# Patient Record
Sex: MALE | Employment: FULL TIME | URBAN - METROPOLITAN AREA
[De-identification: names, ages, dates, MRNs, and addresses within clinical notes are randomized per-mention and may not be internally consistent; named-entity substitution may affect disease eponyms.]

---

## 2021-01-13 ENCOUNTER — HOSPITAL ENCOUNTER (OUTPATIENT)
Dept: ULTRASOUND IMAGING | Facility: HOSPITAL | Age: 66
Discharge: HOME | End: 2021-01-13
Attending: FAMILY MEDICINE
Payer: COMMERCIAL

## 2021-01-13 DIAGNOSIS — E03.9 ACQUIRED HYPOTHYROIDISM: ICD-10-CM

## 2021-01-13 PROCEDURE — 76536 US EXAM OF HEAD AND NECK: CPT | Performed by: FAMILY MEDICINE

## 2024-01-08 ENCOUNTER — OFFICE VISIT (OUTPATIENT)
Dept: DERMATOLOGY CLINIC | Facility: CLINIC | Age: 69
End: 2024-01-08
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
## 2024-01-08 DIAGNOSIS — Z12.83 SKIN CANCER SCREENING: ICD-10-CM

## 2024-01-08 DIAGNOSIS — D22.9 MULTIPLE NEVI: ICD-10-CM

## 2024-01-08 DIAGNOSIS — L81.4 LENTIGO: ICD-10-CM

## 2024-01-08 DIAGNOSIS — D23.9 BENIGN NEOPLASM OF SKIN, UNSPECIFIED LOCATION: ICD-10-CM

## 2024-01-08 DIAGNOSIS — L81.2 EPHELIDES: ICD-10-CM

## 2024-01-08 DIAGNOSIS — L82.1 SK (SEBORRHEIC KERATOSIS): Primary | ICD-10-CM

## 2024-01-08 PROCEDURE — 1160F RVW MEDS BY RX/DR IN RCRD: CPT | Performed by: DERMATOLOGY

## 2024-01-08 PROCEDURE — 1159F MED LIST DOCD IN RCRD: CPT | Performed by: DERMATOLOGY

## 2024-01-08 PROCEDURE — 99203 OFFICE O/P NEW LOW 30 MIN: CPT | Performed by: DERMATOLOGY

## 2024-01-08 PROCEDURE — 1126F AMNT PAIN NOTED NONE PRSNT: CPT | Performed by: DERMATOLOGY

## 2024-01-08 RX ORDER — ALBUTEROL SULFATE 90 UG/1
1 AEROSOL, METERED RESPIRATORY (INHALATION)
COMMUNITY
Start: 2023-11-09 | End: 2024-11-08

## 2024-01-08 RX ORDER — FLUTICASONE PROPIONATE 50 MCG
SPRAY, SUSPENSION (ML) NASAL
COMMUNITY
Start: 2023-09-07

## 2024-01-14 NOTE — PATIENT INSTRUCTIONS
Sun Protection  Wear protective clothing. This includes wearing a broad rimmed hat, long sleeves, high collars, and tightly woven clothes. Specific clothing lines are available for people who need more complete sun avoidance. Use a broad spectrum sunblock (SPF 30 or higher when above options are not possible, or for areas not covered by clothing.    -Avoid being outside when the ultraviolet B (burning) wavelengths from the sun are most intense (10 AM to 3 PM) during non-winter seasons. [place] is year round.    -Ultraviolet A wavelengths from the sun are present year round from jhon to dusk. They pass through window glass. This type of ultraviolet is the form seen in tanning beds. It will accentuate sunburns and will produce most of the sunlight -induced aging changes in our skin because it penetrates much deeper into the skin than do the ultraviolet B wavelengths. It is also associated with an increased chance of developing melanoma, potentially the most serious type of skin cancer.    -Broad spectrum sunblocks:  Mexoryl is a sunblock ingredient that is superior to other UVA protection agents. It is found in Invoice[employer] (available at [employer] or [employer]). Sunblocks containing microfine zinc oxide (Z-sherman) or avobenzone (Parsol 1789) provide good UVA protection too.  Any sunscreen with the American Academy [employer] D[employer]at[employer] Seal is a recommended sunscreen.     -Use SPF 30 or greater an apply liberally. It takes 2 tablespoons or one shot glass to cover the average man in a swim suit. The SPF number provides information about the relative amount of protection provided by a product. Because most people use sunblock sparingly, they may get only 1/2 to 1/3 the SPF coverage indicated on the label. SPF may be sufficient in theory, but in real life it is best to use a higher sunblock, hats, SPF clothing and swimwear, and shade.    -Application technique: Apply sunscreens and sunblocks 20-30 minutes before start off sun  exposure and reapply 20 minutes after exposure begins. Studies suggest this provides improved protection. Reapply sunscreens and sunblocks after 2 hours of use or after 80 minutes if swimming or sweating. Sunscreen effectiveness diminishes after 2 hours unless properly reapplied.     Checking for Skin Cancer  You can find cancer early by checking your skin each month. There are 3 common kinds of skin cancer. They are melanoma, basal cell carcinoma, and squamous cell carcinoma. Doing monthly skin checks is the best way to find new marks or skin changes. Follow the instructions below for checking your skin.  The ABCDEs of checking moles for melanoma  Check your moles or growths for signs of melanoma using ABCDE:  Asymmetry: the sides of the mole or growth don’t match  Border: the edges are ragged, notched, or blurred  Color: the color within the mole or growth varies  Diameter: the mole or growth is larger than 6 mm (size of a pencil eraser)  Evolving: the size, shape, or color of the mole or growth is changing    Checking for other types of skin cancer  Basal cell carcinoma or squamous cell carcinoma have symptoms such as:  A spot or mole that looks different from all other marks on your skin  Changes in how an area feels, such as itching, tenderness, or pain  Changes in the skin's surface, such as oozing, bleeding, or scaliness  A sore that does not heal  New swelling or redness beyond the border of a mole  Who’s at risk?  Anyone can get skin cancer. But you are at greater risk if you have:  Fair skin, light-colored hair, or light-colored eyes  Many moles or abnormal moles on your skin  A history of sunburns from sunlight or tanning beds  A family history of skin cancer  A history of exposure to radiation or chemicals  A weakened immune system  If you have had skin cancer in the past, you are at risk for recurring skin cancer.  How to check your skin  Do your monthly skin checkups in front of a full-length mirror.  Check all parts of your body, including your:  Head (ears, face, neck, and scalp)  Torso (front, back, and sides)  Arms (tops, undersides, upper, and lower armpits)  Hands (palms, backs, and fingers, including under the nails)  Buttocks and genitals  Legs (front, back, and sides)  Feet (tops, soles, toes, including under the nails, and between toes)  If you have a lot of moles, take digital photos of them each month. Make sure to take photos both up close and from a distance. These can help you see if any moles change over time.  Most skin changes are not cancer. But if you see any changes in your skin, call your doctor right away. Only he or she can diagnose a problem. If you have skin cancer, seeing your doctor can be the first step toward getting the treatment that could save your life.  [employer] last reviewed this educational content on 4/1/2019 © 2000-2021 [employer]. All rights reserved. This information is not intended as a substitute for professional medical care. Always follow your healthcare professional's instructions.    Preventing Skin Cancer     Use sunscreen of SPF 30 or greater. Apply liberally.   Relaxing in the sun may feel good. But it isn’t good for your skin. In fact, the sun’s harmful rays are the major cause of skin cancer. This is a serious disease that can be life-threatening. People of all ages, races, and backgrounds are at risk.  Skin cancer is the most common cancer in the [place] But in most cases, it can be prevented.  Your role in prevention  You can act today to help prevent skin cancer. Start by avoiding the sun’s UV (ultraviolet) rays. And don’t use tanning beds or lamps. They are no safer than the sun. Taking these steps can help keep you from getting skin cancer. It can also help prevent wrinkles and other aging effects caused by the sun. Make sure your children also follow these safeguards. Now is the time to start taking steps to prevent skin cancer.  When you are  outdoors  Protect your skin when you go out during the day. Take safety steps whenever you go out to eat, run errands by car or on foot, or do any outdoor activity. There isn’t just one easy way to protect your skin. It’s best to follow all of these steps:  Wear tightly woven clothing that covers your skin. Put on a wide-brimmed hat to protect your face, ears, and scalp.  Watch the clock. Try to stay out of the sun between 10 a.m. and 4 p.m. That's when the sun's rays are strongest.  Head for the shade or create your own. Use an umbrella when sitting or strolling.  Know that the sun’s rays can reflect off sand, water, and snow. This can harm your skin. Take extra care when you are near reflective surfaces.  Keep in mind that even when the weather is hazy or cloudy, your skin can be exposed to strong UV rays.  Shield your skin with sunscreen. Also use sunscreen on your children’s skin. Keep babies younger than 6 months old out of the sun.  Tips for using sunscreen  To help prevent skin cancer, choose the right sunscreen and use it correctly. Try these tips:  Choose a sunscreen that has an SPF (sun protection factor) of at least 30. Also choose a sunscreen labeled \"broad spectrum.” This will protect you from both UVA and UVB (ultraviolet A and B) rays.  If one brand irritates your skin, try another, such as one without fragrance.  Use a water-resistant sunscreen if you swim or sweat.  Use at least 1 ounce of sunscreen to cover exposed areas. This is enough to fill a shot glass. You might need to adjust the amount depending on your body size.  Put the sunscreen on dry skin about 15 minutes before going outdoors. This gives it time to soak in.  Reapply sunscreen every 2 hours. If you’re active, do this more often.  Cover any sun-exposed skin, from your face to your feet. Don’t forget your scalp, ears, and lips.  Know that while sunscreen helps protect you, it isn’t enough. Sunscreens extend the length of time you can be  outdoors before your skin starts to get red. But they don't give you total protection. Using sunscreen doesn't mean you can stay out in the sun for an unlimited time. Your skin cells are still being damaged. You should also wear protective clothing. And try to stay out of the sun as much as you can, especially from 10 a.m. to 4 p.m.  [employer] last reviewed this educational content on 7/1/2019 © 2000-2021 [employer]. All rights reserved. This information is not intended as a substitute for professional medical care. Always follow your healthcare professional's instructions.    Common Types of Skin Cancer  Skin cancer is a serious disease that can affect anyone at any age. It's the most common kind of cancer in the [place] If found early, when it's small and hasn't spread, skin cancer can often be treated with success. But in some cases, it's life-threatening.  Talk with your healthcare provider about what you can do to help prevent skin cancer. Ask about regular skin exams as part of your routine physicals. You can also ask about doing monthly self-exams of your skin. If you see any changes in your skin, call your healthcare provider right away.   Understanding the skin  The skin is made up of 3 layers: epidermis, dermis, and hypodermis or fat layer. The epidermis is the thin outer layer of the skin. It consists of 3 types of cells: squamous cells, basal cells, and melanocytes. The squamous cells are flat cells in the outermost layer and are continuously shed. The basal cells are round cells found just beneath the squamous cells at the base of the epidermis. The melanocytes are found in every layer of the epidermis and make melanin. This give the skin its color. The dermis layer is the middle layer of skin and consists of blood and lymph vessels, hair follicles, oil and sweat glands, nerves and collagen. This layer give skin flexibility and strength. The fat layer is the deepest layer consisting of fat  and collagen. It helps provide the body's heat and protects the body from injury.  Skin cancer is a type of cancer that grows in the epidermal layer of the skin. It can form in the basal cells, squamous cells, or melanocytes. Skin cancer can be grouped into 2 types: non-melanoma and melanoma. The 2 most common types of non-melanoma skin cancer are basal cell carcinoma and squamous cell carcinoma.  Basal cell cancer  Basal cell cancer is the most common skin cancer. It starts in basal cells in the deepest part of the epidermis. It's usually found on the face, ears, neck, trunk, or arms, but it can start anywhere. Varying in color, these lesions may be waxy, pearly, scaly, or scar-like. Tiny blood vessels can sometimes be seen through the lesion’s surface. These lesions can bleed easily and might not heal well. Nearly all basal cell cancers can be treated and cured.  Squamous cell cancer  Squamous cell cancer is another type of skin cancer. It starts in flat cells called squamous cells in the upper part of the epidermis. Lesions often form on the face, ears, neck, hands, or arms. They can start in scars and sores that don't heal. The lesions are firm, red bumps or flat, scaly, crusty growths. Squamous cell carcinoma is more likely to grow and spread to other parts of the body than basal cell carcinoma, although this is still uncommon. Most squamous cell carcinoma is found early enough to be treated and cured.  Melanoma  Melanoma is a less common, but much more dangerous kind of skin cancer. It starts in skin cells called melanocytes. It's more likely to grow and spread than basal or squamous cell cancers. It's often not easy to tell where a melanoma lesion’s borders are. It's often brown or black, but it may be mixed in color. The shape and size of melanoma lesions tend to differ from one side to the other. Melanoma can start anywhere, like the genitals, mouth, palms of hands, bottoms of feet, and under the  nails.  There are other types of skin cancer, too. These include Merkel cell cancer, Kaposi sarcoma, and skin (cutaneous) lymphomas, but these cancers are quite rare.  Actinic keratosis  Actinic keratosis is not skin cancer. It's a common, pre-cancer skin change that can turn into a squamous cell skin cancer over time if left untreated. Actinic keratosis lesions tend to appear on sun-exposed parts of the body. They can be pink, reddish-brown, or skin-colored. These lesions are most often small, raised, scaly, and rough, like sandpaper. In some cases, actinic keratosis lesions hurt. Most people with them have more than one lesion. Getting early treatment for actinic keratoses almost always cures the lesions.  [employer] last reviewed this educational content on 6/1/2019  © 9957-7900 [employer]. All rights reserved. This information is not intended as a substitute for professional medical care. Always follow your healthcare professional's instructions.

## 2024-01-14 NOTE — PROGRESS NOTES
Jose Eduardo Rivera is a 68 year old male.  HPI:     CC:    Chief Complaint   Patient presents with    Lesion     NO hx of skin ca.  NEW pt presenting for lesion of concern to the chest for about 6 months.  Denies bleeding or pain.          Allergies:  Bee venom    HISTORY:    History reviewed. No pertinent past medical history.   History reviewed. No pertinent surgical history.   History reviewed. No pertinent family history.   Social History     Socioeconomic History    Marital status:    Tobacco Use    Smoking status: Never    Smokeless tobacco: Never   Vaping Use    Vaping Use: Never used   Substance and Sexual Activity    Alcohol use: Yes     Alcohol/week: 1.0 - 2.0 standard drink of alcohol     Types: 1 - 2 Glasses of wine per week    Drug use: Never   Other Topics Concern    Grew up on a farm No    History of tanning Yes    Outdoor occupation No    Reaction to local anesthetic No    Pt has a pacemaker No    Pt has a defibrillator No        Current Outpatient Medications   Medication Sig Dispense Refill    atorvastatin 40 MG Oral Tab Take 1 tablet (40 mg total) by mouth daily. 30 tablet 0    albuterol 108 (90 Base) MCG/ACT Inhalation Aero Soln Inhale 1 puff into the lungs. (Patient not taking: Reported on 1/8/2024)      fluticasone propionate 50 MCG/ACT Nasal Suspension SHAKE LIQUID AND USE 1 SPRAY IN EACH NOSTRIL IN THE MORNING AND IN THE EVENING (Patient not taking: Reported on 1/8/2024)       Allergies:   Allergies   Allergen Reactions    Bee Venom RASH       History reviewed. No pertinent past medical history.  History reviewed. No pertinent surgical history.  Social History     Socioeconomic History    Marital status:      Spouse name: Not on file    Number of children: Not on file    Years of education: Not on file    Highest education level: Not on file   Occupational History    Not on file   Tobacco Use    Smoking status: Never    Smokeless tobacco: Never   Vaping Use    Vaping Use: Never used    Substance and Sexual Activity    Alcohol use: Yes     Alcohol/week: 1.0 - 2.0 standard drink of alcohol     Types: 1 - 2 Glasses of wine per week    Drug use: Never    Sexual activity: Not on file   Other Topics Concern    Grew up on a farm No    History of tanning Yes    Outdoor occupation No    Reaction to local anesthetic No    Pt has a pacemaker No    Pt has a defibrillator No   Social History Narrative    Not on file     Social Determinants of Health     Financial Resource Strain: Not on file   Food Insecurity: Not on file   Transportation Needs: Not on file   Physical Activity: Not on file   Stress: Not on file   Social Connections: Not on file   Housing Stability: Not on file     History reviewed. No pertinent family history.    There were no vitals filed for this visit.    HPI:  Chief Complaint   Patient presents with    Lesion     NO hx of skin ca.  NEW pt presenting for lesion of concern to the chest for about 6 months.  Denies bleeding or pain.          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  Has noted varicose veins does wear compression    Patient presents with concerns above.    Patient has been in their usual state of health.     Past notes/ records and appropriate/relevant lab results including pathology and past body maps reviewed. Including outside notes/ PCP notes as appropriate. Updated and new information noted in current visit.     ROS:  Denies other relevant systemic complaints.      History, medications, allergies reviewed as noted.       Physical Examination:     Well-developed well-nourished patient alert oriented in no acute distress.  Exam performed, including scalp, head, neck, face,nails, hair, external eyes, including conjunctival mucosa, eyelids, lips external ears , arms, digits,palms.     Multiple light to medium  brown, well marginated, uniformly pigmented, macules and papules 6 mm and less scattered on exam. pigmented lesions examined with dermoscopy benign-appearing patterns.     Waxy tannish keratotic papules scattered, cherry-red vascular papules scattered.    See map today's date for lesions noted .  See assessment and plan below for specific lesions.    Otherwise remarkable for lesions as noted on map.    See A/P  below for additional information:    Assessment / plan:    No orders of the defined types were placed in this encounter.      Meds & Refills for this Visit:  Requested Prescriptions      No prescriptions requested or ordered in this encounter         Encounter Diagnoses   Name Primary?    SK (seborrheic keratosis) Yes    Ephelides     Lentigo     Multiple nevi     Benign neoplasm of skin, unspecified location     Skin cancer screening     Has sun exposure due to outdoor employment        Patient with multiple lentigines, ephelides keratoses scattered over the chest back neck.  More prominent sun damage and lentigines along the posterior neck.  Encourage sunscreen sun protection.    Particularly while driving.  Encouraged hat, long sleeves.    Ephelides over the lower extremities    Patient with prominent varicosities over the lower extremities compression, monitor    Benign-appearing nevi  No other susupicious lesions on todays  exam.    Most prominent actinic damage over the head neck, forearms  Follow-up annually given past sun exposure for as needed    Please refer to map for specific lesions.  See additional diagnoses.  Pros cons of various therapies, risks benefits discussed.Pathophysiology discussed with patient.  Therapeutic options reviewed.  See  Medications in grid.  Instructions reviewed at length.    Benign nevi, seborrheic  keratoses, cherry angiomas:  Reassurance regarding other benign skin lesions.Signs and symptoms of skin cancer, ABCDE's of melanoma discussed with patient. Sunscreen use, sun  protection, self exams reviewed.  Followup as noted RTC ---routine checkup    6 mos -one year or p.r.n.    Encounter Times   Including precharting, reviewing chart, prior notes obtaining history: 10 minutes, medical exam :10 minutes, notes on body map, plan, counseling 10minutes My total time spent caring for the patient on the day of the encounter: 30 minutes     The patient indicates understanding of these issues and agrees to the plan.  The patient is asked to return as noted in follow-up/ above.    This note was generated using Dragon voice recognition software.  Please contact me regarding any confusion resulting from errors in recognition..  Note to patient and family: The 21st Century Cures Act makes medical notes like these available to patients. However, be advised this is a medical document. It is intended as [id number] communication and monitoring of a patient's care needs. It is written in medical language and may contain abbreviations or verbiage that are unfamiliar. It may appear blunt or direct. Medical documents are intended to carry relevant information, facts as evident and the clinical opinion of the practitioner.